# Patient Record
Sex: FEMALE | Race: WHITE | NOT HISPANIC OR LATINO
[De-identification: names, ages, dates, MRNs, and addresses within clinical notes are randomized per-mention and may not be internally consistent; named-entity substitution may affect disease eponyms.]

---

## 2019-03-05 ENCOUNTER — RECORD ABSTRACTING (OUTPATIENT)
Age: 64
End: 2019-03-05

## 2019-03-08 ENCOUNTER — NON-APPOINTMENT (OUTPATIENT)
Age: 64
End: 2019-03-08

## 2019-03-08 ENCOUNTER — APPOINTMENT (OUTPATIENT)
Dept: CARDIOLOGY | Facility: CLINIC | Age: 64
End: 2019-03-08
Payer: MEDICARE

## 2019-03-08 ENCOUNTER — RECORD ABSTRACTING (OUTPATIENT)
Age: 64
End: 2019-03-08

## 2019-03-08 VITALS
SYSTOLIC BLOOD PRESSURE: 120 MMHG | HEART RATE: 72 BPM | HEIGHT: 62 IN | BODY MASS INDEX: 22.45 KG/M2 | RESPIRATION RATE: 16 BRPM | DIASTOLIC BLOOD PRESSURE: 75 MMHG | WEIGHT: 122 LBS

## 2019-03-08 DIAGNOSIS — Z00.00 ENCOUNTER FOR GENERAL ADULT MEDICAL EXAMINATION W/OUT ABNORMAL FINDINGS: ICD-10-CM

## 2019-03-08 DIAGNOSIS — Z87.898 PERSONAL HISTORY OF OTHER SPECIFIED CONDITIONS: ICD-10-CM

## 2019-03-08 DIAGNOSIS — Z82.3 FAMILY HISTORY OF STROKE: ICD-10-CM

## 2019-03-08 PROCEDURE — 99214 OFFICE O/P EST MOD 30 MIN: CPT

## 2019-03-08 PROCEDURE — 93000 ELECTROCARDIOGRAM COMPLETE: CPT

## 2019-03-08 RX ORDER — ATENOLOL 25 MG/1
25 TABLET ORAL DAILY
Refills: 1 | Status: DISCONTINUED | COMMUNITY
End: 2019-03-08

## 2019-03-08 RX ORDER — CHROMIUM 200 MCG
TABLET ORAL DAILY
Refills: 0 | Status: ACTIVE | COMMUNITY

## 2019-03-11 NOTE — REASON FOR VISIT
[FreeTextEntry1] : This is a 64-year-old female who presents here for cardiac reevaluation after nearly a 4-year hiatus.  Her history includes:\par 1.  Mild valvular heart disease. \par 2.  Membranous VST. \par 3.  Periodic palpitations.  \par \par Her primary complaint at this time is an episode of crushing chest pain that occurred about a week ago, awakening her from sleep.  The episode lasted about one minute and it resolved spontaneously.  It was not associated with any radiation pattern, nor any other associated symptoms.  \par \par She has not had this in the past.  She does have gastroesophageal reflux.  There has been increased stress in her life as she will be losing her job shortly. \par \par She does exercise, walking nearly daily, and does not have any exertional symptoms related to this.  She denies shortness of breath, PND, orthopnea, edema, syncope, or near-syncope. \par \par Palpitations have been less frequent. \par \par There are no other significant new medical problems.  \par \par     \par

## 2019-03-11 NOTE — HISTORY OF PRESENT ILLNESS
[FreeTextEntry1] : ECG:  Normal sinus rhythm at 72.  Left atrial enlargement without any significant ST-T wave changes.  Recent laboratory data obtained on 11/3/18 was remarkable for cholesterol 221.  HDL 70. . Electrolytes and LFTs normal.  Hemoglobin 13.3.   Impression: 1.  The patient is a 64-year-old with a history of membranous VSD, palpitations, elevated cholesterol who presents here with an episode of chest tightness.  2.  I think based on the history, it is most likely this was esophageal spasm stemming from her reflux disease.   In view of her age and her active lifestyle, I recommend that she consider regular exercise stress test to screen her.  Additionally, because of her membranous VSD, she should also undergo repeat echocardiogram.    I encouraged her to follow a stricter diet and see if we can get her LDL down without employing any pharmacotherapy.  She has not felt the need to use beta blockers for her palpitations as they have been fairly infrequent.  We will regroup after the testing and asked her to contact with regard to any recurrent chest discomfort.

## 2019-03-11 NOTE — ASSESSMENT
[FreeTextEntry1] : ECG:  Normal sinus rhythm at 72.  Left atrial enlargement without any significant ST-T wave changes.  Recent laboratory data obtained on 11/3/18 was remarkable for cholesterol 221. \par HDL 70.\par .\par Electrolytes and LFTs normal. \par Hemoglobin 13.3. \par \par Impression:\par 1.  The patient is a 64-year-old with a history of membranous VSD, palpitations, elevated cholesterol who presents here with an episode of chest tightness. \par 2.  I think based on the history, it is most likely this was esophageal spasm stemming from her reflux disease. \par \par In view of her age and her active lifestyle, I recommend that she consider regular exercise stress test to screen her.  Additionally, because of her membranous VSD, she should also undergo repeat echocardiogram.  \par \par I encouraged her to follow a stricter diet and see if we can get her LDL down without employing any pharmacotherapy.  She has not felt the need to use beta blockers for her palpitations as they have been fairly infrequent.  We will regroup after the testing and asked her to contact with regard to any recurrent chest discomfort.    \par

## 2019-04-12 ENCOUNTER — APPOINTMENT (OUTPATIENT)
Dept: CARDIOLOGY | Facility: CLINIC | Age: 64
End: 2019-04-12
Payer: MEDICARE

## 2019-04-12 PROCEDURE — 93015 CV STRESS TEST SUPVJ I&R: CPT

## 2019-04-12 PROCEDURE — 93306 TTE W/DOPPLER COMPLETE: CPT

## 2019-06-03 ENCOUNTER — APPOINTMENT (OUTPATIENT)
Dept: CARDIOLOGY | Facility: CLINIC | Age: 64
End: 2019-06-03
Payer: MEDICARE

## 2019-06-03 ENCOUNTER — RECORD ABSTRACTING (OUTPATIENT)
Age: 64
End: 2019-06-03

## 2019-06-03 VITALS
RESPIRATION RATE: 16 BRPM | SYSTOLIC BLOOD PRESSURE: 100 MMHG | BODY MASS INDEX: 23 KG/M2 | WEIGHT: 125 LBS | HEIGHT: 62 IN | OXYGEN SATURATION: 99 % | DIASTOLIC BLOOD PRESSURE: 75 MMHG

## 2019-06-03 DIAGNOSIS — N95.2 POSTMENOPAUSAL ATROPHIC VAGINITIS: ICD-10-CM

## 2019-06-03 PROCEDURE — 93000 ELECTROCARDIOGRAM COMPLETE: CPT

## 2019-06-03 PROCEDURE — 99214 OFFICE O/P EST MOD 30 MIN: CPT

## 2019-06-03 NOTE — REASON FOR VISIT
[FreeTextEntry1] : This is a 64-year-old female who presents here for cardiac reevaluation\par  Her history includes:\par 1.  Mild valvular heart disease. \par 2.  Membranous VST. \par 3.  Periodic palpitations.  \par \par There have been no recurrent episodes of chest pain since her first episode in March of 2019 which was thought to be cardiac vs gastroesophageal reflux which she has a history of.\par  \par \par She does exercise, walking nearly daily, and does not have any exertional symptoms related to this.  She denies shortness of breath, PND, orthopnea, edema, syncope, or near-syncope. \par \par Denies any recent palpations at this time. \par \par There are no other significant new medical problems.  \par \par     \par

## 2019-06-03 NOTE — ASSESSMENT
[FreeTextEntry1] : ECG:  Normal sinus rhythm at 72.  Left atrial enlargement without any significant ST-T wave changes.  \par \par Echocardiogram 4/12/19:\par Overall normal LV size and function. Ejection fraction 60-65%\par VSD is not seen on this study.\par Mild mitral and tricuspid insufficiency.\par No evidence of pulmonary hypertension.\par \par Recent laboratory data obtained on 11/3/18 was remarkable for cholesterol 221. \par HDL 70.\par .\par Electrolytes and LFTs normal. \par Hemoglobin 13.3. \par \par Impression:\par 1.  The patient is a 64-year-old with a history of membranous VSD, palpitations, elevated cholesterol who presents for cardiac re-evaluation and resolution of x 1 episode of chest tightness in March of 2019. It was thought to be related to an esophageal spasm stemming from her reflux disease.\par \par 2. Echo revealed  aortic plaque formation. Therefore  I have prescribed Atorvastatin 10 mg once daily and have repeat blood work collected in 3 months\par \par 3. At this time no cardiac testing needs to be performed and she can follow up in one year.\par \par 4. At this time her palpations have been infrequent and  controlled with out the use of beta blockers.\par She is to contact my office  she is experiencing SOB  chest pain \par \par

## 2019-09-09 ENCOUNTER — RX RENEWAL (OUTPATIENT)
Age: 64
End: 2019-09-09

## 2019-09-09 LAB
ALBUMIN SERPL ELPH-MCNC: 4.5 G/DL
ALP BLD-CCNC: 67 U/L
ALT SERPL-CCNC: 9 U/L
AST SERPL-CCNC: 22 U/L
BILIRUB DIRECT SERPL-MCNC: 0.1 MG/DL
BILIRUB INDIRECT SERPL-MCNC: 0.3 MG/DL
BILIRUB SERPL-MCNC: 0.4 MG/DL
CHOLEST SERPL-MCNC: 174 MG/DL
CHOLEST/HDLC SERPL: 2.3 RATIO
HDLC SERPL-MCNC: 77 MG/DL
LDLC SERPL CALC-MCNC: 82 MG/DL
PROT SERPL-MCNC: 7 G/DL
TRIGL SERPL-MCNC: 74 MG/DL

## 2020-05-31 LAB
ALBUMIN SERPL ELPH-MCNC: 4.6 G/DL
ALP BLD-CCNC: 59 U/L
ALT SERPL-CCNC: 12 U/L
AST SERPL-CCNC: 26 U/L
BILIRUB DIRECT SERPL-MCNC: 0.1 MG/DL
BILIRUB INDIRECT SERPL-MCNC: 0.3 MG/DL
BILIRUB SERPL-MCNC: 0.4 MG/DL
PROT SERPL-MCNC: 7 G/DL

## 2020-06-01 ENCOUNTER — TRANSCRIPTION ENCOUNTER (OUTPATIENT)
Age: 65
End: 2020-06-01

## 2020-07-10 ENCOUNTER — NON-APPOINTMENT (OUTPATIENT)
Age: 65
End: 2020-07-10

## 2020-07-10 ENCOUNTER — APPOINTMENT (OUTPATIENT)
Dept: CARDIOLOGY | Facility: CLINIC | Age: 65
End: 2020-07-10
Payer: MEDICARE

## 2020-07-10 VITALS
BODY MASS INDEX: 22.08 KG/M2 | WEIGHT: 120 LBS | TEMPERATURE: 98 F | HEART RATE: 56 BPM | HEIGHT: 62 IN | DIASTOLIC BLOOD PRESSURE: 70 MMHG | SYSTOLIC BLOOD PRESSURE: 100 MMHG | RESPIRATION RATE: 16 BRPM | OXYGEN SATURATION: 99 %

## 2020-07-10 PROCEDURE — 99214 OFFICE O/P EST MOD 30 MIN: CPT

## 2020-07-10 PROCEDURE — 93000 ELECTROCARDIOGRAM COMPLETE: CPT

## 2020-07-10 NOTE — REASON FOR VISIT
[FreeTextEntry1] : This is a 65 -year-old female who presents here for cardiac reevaluation\par  Her history includes:\par 1.  Mild valvular heart disease. \par 2.  Membranous VSD. \par 3.  Periodic palpitations.  \par \par There have been no recurrent episodes of chest pain since her first episode in March of 2019 which was thought to be cardiac vs gastroesophageal reflux which she has a history of.\par  \par \par She does exercise, walking nearly daily, and does not have any exertional symptoms related to this.  She denies shortness of breath, PND, orthopnea, edema, syncope, or near-syncope. \par \par Denies any recent palpations at this time. \par \par There are no other significant new medical problems.  \par \par     \par

## 2020-07-10 NOTE — HISTORY OF PRESENT ILLNESS
[FreeTextEntry1] : She exercises regularly and without difficulty.\par There've been no new intercurrent or polyps.\par Recent blood work was performed in May.\par

## 2020-11-04 LAB
CHOLEST SERPL-MCNC: 185 MG/DL
CHOLEST/HDLC SERPL: 2.3 RATIO
HDLC SERPL-MCNC: 80 MG/DL
LDLC SERPL CALC-MCNC: 90 MG/DL
TRIGL SERPL-MCNC: 77 MG/DL

## 2020-11-17 ENCOUNTER — TRANSCRIPTION ENCOUNTER (OUTPATIENT)
Age: 65
End: 2020-11-17

## 2020-11-24 ENCOUNTER — APPOINTMENT (OUTPATIENT)
Dept: SURGERY | Facility: CLINIC | Age: 65
End: 2020-11-24
Payer: MEDICARE

## 2020-11-24 VITALS
WEIGHT: 120 LBS | HEIGHT: 62 IN | DIASTOLIC BLOOD PRESSURE: 86 MMHG | SYSTOLIC BLOOD PRESSURE: 134 MMHG | HEART RATE: 66 BPM | BODY MASS INDEX: 22.08 KG/M2 | OXYGEN SATURATION: 99 %

## 2020-11-24 VITALS — TEMPERATURE: 98.1 F

## 2020-11-24 DIAGNOSIS — Z86.39 PERSONAL HISTORY OF OTHER ENDOCRINE, NUTRITIONAL AND METABOLIC DISEASE: ICD-10-CM

## 2020-11-24 PROCEDURE — 99213 OFFICE O/P EST LOW 20 MIN: CPT

## 2020-11-24 NOTE — PHYSICAL EXAM
[Normocephalic] : normocephalic [Atraumatic] : atraumatic [EOMI] : extra ocular movement intact [PERRL] : pupils equal, round and reactive to light [Sclera nonicteric] : sclera nonicteric [Supple] : supple [No Supraclavicular Adenopathy] : no supraclavicular adenopathy [No Cervical Adenopathy] : no cervical adenopathy [Clear to Auscultation Bilat] : clear to auscultation bilaterally [Normal Sinus Rhythm] : normal sinus rhythm [Examined in the supine and seated position] : examined in the supine and seated position [Symmetrical] : symmetrical [No dominant masses] : no dominant masses in right breast  [No dominant masses] : no dominant masses left breast [No Nipple Retraction] : no left nipple retraction [No Nipple Discharge] : no left nipple discharge [Breast Nipple Inversion] : nipples not inverted [Breast Nipple Retraction] : nipples not retracted [Breast Nipple Flattening] : nipples not flattened [Breast Nipple Fissures] : nipples not fissured [Breast Abnormal Lactation (Galactorrhea)] : no galactorrhea [Breast Abnormal Secretion Bloody Fluid] : no bloody discharge [Breast Abnormal Secretion Serous Fluid] : no serous discharge [Breast Abnormal Secretion Opalescent Fluid] : no milky discharge [No Axillary Lymphadenopathy] : no left axillary lymphadenopathy [Soft] : abdomen soft [Not Tender] : non-tender [Normal Bowel Sounds] : normal bowel sounds  [No Edema] : no edema [No Rashes] : no rashes [No Ulceration] : no ulceration

## 2020-11-24 NOTE — HISTORY OF PRESENT ILLNESS
[FreeTextEntry1] : 65 year old white female with fibrocystic breast for folllow up. Denies any palpable masses or pain. Has no nipple discharge\par risk factors\par  FHx negative\par  1st period 13\par   without childbirth\par    menopause at 46 \par    with two breast bx

## 2020-12-14 LAB
ALBUMIN SERPL ELPH-MCNC: 4.5 G/DL
ALP BLD-CCNC: 64 U/L
ALT SERPL-CCNC: 9 U/L
ANION GAP SERPL CALC-SCNC: 10 MMOL/L
AST SERPL-CCNC: 24 U/L
BILIRUB SERPL-MCNC: 0.4 MG/DL
BUN SERPL-MCNC: 19 MG/DL
CALCIUM SERPL-MCNC: 9.5 MG/DL
CHLORIDE SERPL-SCNC: 103 MMOL/L
CHOLEST SERPL-MCNC: 176 MG/DL
CO2 SERPL-SCNC: 25 MMOL/L
CREAT SERPL-MCNC: 1.03 MG/DL
GLUCOSE SERPL-MCNC: 87 MG/DL
HDLC SERPL-MCNC: 75 MG/DL
LDLC SERPL CALC-MCNC: 86 MG/DL
NONHDLC SERPL-MCNC: 101 MG/DL
POTASSIUM SERPL-SCNC: 4.4 MMOL/L
PROT SERPL-MCNC: 6.8 G/DL
SODIUM SERPL-SCNC: 138 MMOL/L
TRIGL SERPL-MCNC: 75 MG/DL

## 2021-02-22 ENCOUNTER — TRANSCRIPTION ENCOUNTER (OUTPATIENT)
Age: 66
End: 2021-02-22

## 2021-05-19 ENCOUNTER — NON-APPOINTMENT (OUTPATIENT)
Age: 66
End: 2021-05-19

## 2021-06-03 LAB
ALBUMIN SERPL ELPH-MCNC: 4.4 G/DL
ALP BLD-CCNC: 69 U/L
ALT SERPL-CCNC: 8 U/L
ANION GAP SERPL CALC-SCNC: 10 MMOL/L
AST SERPL-CCNC: 21 U/L
BILIRUB SERPL-MCNC: 0.4 MG/DL
BUN SERPL-MCNC: 12 MG/DL
CALCIUM SERPL-MCNC: 9.6 MG/DL
CHLORIDE SERPL-SCNC: 105 MMOL/L
CHOLEST SERPL-MCNC: 156 MG/DL
CO2 SERPL-SCNC: 25 MMOL/L
CREAT SERPL-MCNC: 0.83 MG/DL
GLUCOSE SERPL-MCNC: 89 MG/DL
HDLC SERPL-MCNC: 68 MG/DL
LDLC SERPL CALC-MCNC: 68 MG/DL
NONHDLC SERPL-MCNC: 87 MG/DL
POTASSIUM SERPL-SCNC: 4.8 MMOL/L
PROT SERPL-MCNC: 6.5 G/DL
SODIUM SERPL-SCNC: 140 MMOL/L
TRIGL SERPL-MCNC: 94 MG/DL

## 2021-06-14 ENCOUNTER — APPOINTMENT (OUTPATIENT)
Dept: CARDIOLOGY | Facility: CLINIC | Age: 66
End: 2021-06-14
Payer: MEDICARE

## 2021-06-14 VITALS
SYSTOLIC BLOOD PRESSURE: 98 MMHG | BODY MASS INDEX: 22.08 KG/M2 | RESPIRATION RATE: 16 BRPM | DIASTOLIC BLOOD PRESSURE: 70 MMHG | WEIGHT: 120 LBS | TEMPERATURE: 97.2 F | HEART RATE: 65 BPM | HEIGHT: 62 IN | OXYGEN SATURATION: 99 %

## 2021-06-14 PROCEDURE — 99214 OFFICE O/P EST MOD 30 MIN: CPT

## 2021-06-14 PROCEDURE — 93000 ELECTROCARDIOGRAM COMPLETE: CPT

## 2021-06-14 RX ORDER — ASPIRIN 325 MG/1
TABLET, FILM COATED ORAL
Refills: 0 | Status: DISCONTINUED | COMMUNITY
End: 2021-06-14

## 2021-06-14 NOTE — PHYSICAL EXAM
[FreeTextEntry1] :                    Well appearing and nourished with no obvious deformities or distress.\par \par Eyes: \par No conjunctival injection and no xanthelasmas.\par HEENT: \par Normocephalic.Normal oral mucosa. No pallor or cyanosis\par Neck: \par No jugular venous distension. with normal A and V wave forms. No palpable adenopathy.\par Cardiovascular: \par Normal rate and rhythm with normal S1, S2 and a grade 1/6 systolic murmur. Distal arterial pulses are normal. No significant peripheral edema.\par Pulmonary: \par Lungs are clear to auscultation and percussion. Normal respiratory pattern without any accessory muscle use\par Abdomen: \par Soft, non-tender ; no palpable organomegaly or masses.\par Extremities:\par No digital clubbing, cyanosis or ischemic changes.\par Skin: Erythematous rash on trunk and arms consistent with poison ivy\par No skin lesions, rashes, ulcers or xanthomas.\par Psychiatric: \par Alert and oriented to person, place and time. Appropriate mood and affect.

## 2021-06-14 NOTE — REASON FOR VISIT
[FreeTextEntry1] : This is a 66 -year-old female who presents here for cardiac reevaluation\par  Her history includes:\par 1.  Mild valvular heart disease. \par 2.  Membranous VSD. \par 3.  Periodic palpitations.  \par 4.  Atherosclerosis of the aorta\par 5.  Hyperlipidemia\par \par There have been no recurrent episodes of chest pain since her first episode in March of 2019 which was thought to be cardiac vs gastroesophageal reflux \par  \par She does exercise, walking nearly daily, and does not have any exertional symptoms related to this.  She denies shortness of breath, PND, orthopnea, edema, syncope, or near-syncope. \par \par Denies any recent palpations at this time. \par \par Recent contact and outbreak secondary to poison ivy\par There are no other significant new medical problems.  \par \par     \par

## 2021-06-14 NOTE — ASSESSMENT
[FreeTextEntry1] : ECG:  Normal sinus rhythm at 65 .  Left atrial enlargement , PRWP without any significant ST-T wave changes.  \par \par Echocardiogram 19:\par Overall normal LV size and function. Ejection fraction 60-65%\par VSD is not seen on this study.\par Mild mitral and tricuspid insufficiency.\par No evidence of pulmonary hypertension.\par \par Lab Data     \par       5/29/20    6/3/21\par Chol: 185         156\par HDL: 80            68\par LDL: 90            68\par Tr            94\par \par  laboratory data 11/3/18 \par cholesterol 221. \par HDL 70.\par .\par Electrolytes and LFTs normal. \par Hemoglobin 13.3. \par \par Impression:\par 1.  The patient is a 66 -year-old with a history of membranous VSD, palpitations, elevated cholesterol who presents for cardiac re-evaluation \par \par 2. Echo revealed  aortic plaque formation. Therefore  started Atorvastatin 10 mg once daily and  repeat blood work work showed appropriate reduction of cholesterol\par \par 3.  VSD, cardiac chamber sizes and valvular disease has been relatively stable.  Repeat echocardiogram is anticipated here at follow-up\par \par 4. At this time her palpations have been infrequent and  controlled with out the use of beta blockers.\par She is to contact my office  she is experiencing SOB  chest pain \par \par Plan:\par 1.  Repeat laboratory data in 6 months\par \par 2.  Echocardiogram at follow-up here in 6 months\par \par 3.  Contact me with regard to any new symptoms of palpitations shortness of breath or chest pain

## 2021-06-14 NOTE — HISTORY OF PRESENT ILLNESS
[FreeTextEntry1] : She exercises regularly and without difficulty.\par There've been no new intercurrent or polyps.\par Recent blood work was performed in June\par

## 2021-06-14 NOTE — REVIEW OF SYSTEMS
[Weight Gain (___ Lbs)] : no recent weight gain [Weight Loss (___ Lbs)] : no recent weight loss [Rash] : rash [FreeTextEntry2] : Other than as documented here and in the HPI, the thirteen point ROS is negative

## 2021-06-18 ENCOUNTER — TRANSCRIPTION ENCOUNTER (OUTPATIENT)
Age: 66
End: 2021-06-18

## 2021-07-02 ENCOUNTER — TRANSCRIPTION ENCOUNTER (OUTPATIENT)
Age: 66
End: 2021-07-02

## 2021-12-09 ENCOUNTER — APPOINTMENT (OUTPATIENT)
Dept: ORTHOPEDIC SURGERY | Facility: CLINIC | Age: 66
End: 2021-12-09

## 2021-12-17 LAB
ALBUMIN SERPL ELPH-MCNC: 4.3 G/DL
ALP BLD-CCNC: 72 U/L
ALT SERPL-CCNC: 9 U/L
ANION GAP SERPL CALC-SCNC: 12 MMOL/L
AST SERPL-CCNC: 23 U/L
BILIRUB SERPL-MCNC: 0.3 MG/DL
BUN SERPL-MCNC: 17 MG/DL
CALCIUM SERPL-MCNC: 9.3 MG/DL
CHLORIDE SERPL-SCNC: 104 MMOL/L
CHOLEST SERPL-MCNC: 169 MG/DL
CO2 SERPL-SCNC: 24 MMOL/L
CREAT SERPL-MCNC: 0.95 MG/DL
GLUCOSE SERPL-MCNC: 90 MG/DL
HDLC SERPL-MCNC: 76 MG/DL
LDLC SERPL CALC-MCNC: 78 MG/DL
NONHDLC SERPL-MCNC: 93 MG/DL
POTASSIUM SERPL-SCNC: 4.7 MMOL/L
PROT SERPL-MCNC: 6.8 G/DL
SODIUM SERPL-SCNC: 140 MMOL/L
TRIGL SERPL-MCNC: 75 MG/DL

## 2021-12-20 ENCOUNTER — APPOINTMENT (OUTPATIENT)
Dept: SURGERY | Facility: CLINIC | Age: 66
End: 2021-12-20
Payer: MEDICARE

## 2021-12-20 VITALS
WEIGHT: 120 LBS | TEMPERATURE: 96.7 F | OXYGEN SATURATION: 100 % | BODY MASS INDEX: 22.08 KG/M2 | DIASTOLIC BLOOD PRESSURE: 80 MMHG | HEART RATE: 67 BPM | HEIGHT: 62 IN | SYSTOLIC BLOOD PRESSURE: 127 MMHG

## 2021-12-20 PROCEDURE — 99212 OFFICE O/P EST SF 10 MIN: CPT

## 2021-12-20 NOTE — ASSESSMENT
[FreeTextEntry1] : I have reviewed the results of her breast mammo and sonogram of 12/10/21 which were benign

## 2021-12-20 NOTE — HISTORY OF PRESENT ILLNESS
[FreeTextEntry1] : 66 year old white female with fibrocystic breasts for followup. She denies any new masses, pain or swelling. She does not have nipple discharge.\par RISK FACTORS'\par   1st period 11\par    without child birth\par     menopause  at 48\par     FHX grandmother with uterine cancer

## 2021-12-20 NOTE — PAST MEDICAL HISTORY
[Postmenopausal] : The patient is postmenopausal [Menarche Age ____] : age at menarche was [unfilled] [Menopause Age____] : age at menopause was [unfilled] [History of Hormone Replacement Treatment] : has no history of hormone replacement treatment [FreeTextEntry5] : N/A [FreeTextEntry6] : N/A [FreeTextEntry7] : N/A [FreeTextEntry8] : N/A

## 2022-01-24 ENCOUNTER — TRANSCRIPTION ENCOUNTER (OUTPATIENT)
Age: 67
End: 2022-01-24

## 2022-03-15 ENCOUNTER — APPOINTMENT (OUTPATIENT)
Dept: ORTHOPEDIC SURGERY | Facility: CLINIC | Age: 67
End: 2022-03-15

## 2022-04-13 ENCOUNTER — NON-APPOINTMENT (OUTPATIENT)
Age: 67
End: 2022-04-13

## 2022-04-14 ENCOUNTER — APPOINTMENT (OUTPATIENT)
Dept: CARDIOLOGY | Facility: CLINIC | Age: 67
End: 2022-04-14
Payer: MEDICARE

## 2022-04-14 PROCEDURE — 93306 TTE W/DOPPLER COMPLETE: CPT

## 2022-04-28 ENCOUNTER — APPOINTMENT (OUTPATIENT)
Dept: CARDIOLOGY | Facility: CLINIC | Age: 67
End: 2022-04-28
Payer: MEDICARE

## 2022-04-28 VITALS
HEART RATE: 56 BPM | WEIGHT: 125 LBS | SYSTOLIC BLOOD PRESSURE: 130 MMHG | HEIGHT: 62 IN | DIASTOLIC BLOOD PRESSURE: 70 MMHG | BODY MASS INDEX: 23 KG/M2 | OXYGEN SATURATION: 98 % | RESPIRATION RATE: 16 BRPM

## 2022-04-28 PROCEDURE — 93000 ELECTROCARDIOGRAM COMPLETE: CPT

## 2022-04-28 PROCEDURE — 99214 OFFICE O/P EST MOD 30 MIN: CPT

## 2022-04-28 NOTE — REVIEW OF SYSTEMS
[Weight Gain (___ Lbs)] : [unfilled] ~Ulb weight gain [Weight Loss (___ Lbs)] : no recent weight loss [Rash] : rash [FreeTextEntry2] : Other than as documented here and in the HPI, the thirteen point ROS is negative

## 2022-04-28 NOTE — ASSESSMENT
[FreeTextEntry1] : ECG:  Normal sinus rhythm at 56.  Left atrial enlargement , PRWP without any significant ST-T wave changes.  \par \par Echocardiogram 4/14/22\par Normal LV function EF 60%\par Moderate TR\par Possible small membranous VSD\par No evidence of pulmonary hypertension.\par \par Echocardiogram 4/12/19:\par Overall normal LV size and function. Ejection fraction 60-65%\par VSD is not seen on this study.\par Mild mitral and tricuspid insufficiency.\par No evidence of pulmonary hypertension.\par \par Lab Data     \par ----5/29/20----6/3/21\par Chol---185------156\par HDL----80--------68\par LDL----90--------68\par Trg-----77--------94\par \par  laboratory data 11/3/18 \par cholesterol 221. \par HDL 70.\par .\par Electrolytes and LFTs normal. \par Hemoglobin 13.3. \par \par Impression:\par 1.  The patient is a 67 -year-old with a history of membranous VSD, palpitations, elevated cholesterol who presents for cardiac re-evaluation \par \par 2. Echo revealed  aortic plaque formation and was started on atorvastatin 10 mg once daily and  repeat blood work work showed appropriate reduction of cholesterol.  Patient voices concerns about potential for coronary artery plaquing and possible obstructive disease\par \par 3.  VSD, cardiac chamber sizes and valvular disease has been relatively stable.  Repeat echocardiogram revealed no substantial changes.\par \par 4.  Palpitations previously well controlled with beta-blockers, however, little more frequent lately.\par \par 5.  Vaguely described dizziness, which sounds more like disequilibrium.  Relationship between onset and the spring pollen cannot be ignored.  Retrospectively patient had the symptoms last spring as well.\par \par Plan:\par 1.  Coronary CT scoring to be obtained\par \par 2.  Trial antihistamine daily.\par \par 3.  Contact me with regard to any new symptoms of palpitations shortness of breath or chest pain\par \par 4.  Repeat labs at 6-month interval.  Clinical follow-up here in the fall.

## 2022-04-28 NOTE — REASON FOR VISIT
[FreeTextEntry1] : This is a 67  -year-old female who presents here for cardiac reevaluation\par  Her history includes:\par 1.  Mild valvular heart disease. \par 2.  Membranous VSD. \par 3.  Periodic palpitations.  \par 4.  Atherosclerosis of the aorta\par 5.  Hyperlipidemia\par \par Patient presents here with concerns about dizziness of several weeks duration.\par The dizziness is best described as an off-balance sensation.  There is no vertigo.  No change in vision.  No syncope or presyncope.\par \par Approximately 3 months ago she had 1 episode in her kitchen when she suddenly felt presyncopal.  There was a generalized loss of vision but no true loss of consciousness.  This was short-lived and spontaneously resolved.\par \par Patient also continues to have palpitations which are a chronic complaint that have not substantially changed.  \par \par A work-up thus far has been undertaken including imaging of her brain with an MRI and a CT scan.  The results are all fairly benign.  \par \par There have been no recurrent episodes of chest pain since her first episode in March of 2019 which was thought to be cardiac vs gastroesophageal reflux \par  \par She does exercise, walking nearly daily, and does not have any exertional symptoms related to this.  She denies shortness of breath, PND, orthopnea, edema, syncope, or near-syncope. \par \par \par     \par

## 2022-06-08 ENCOUNTER — APPOINTMENT (OUTPATIENT)
Dept: AFTER HOURS CARE | Facility: EMERGENCY ROOM | Age: 67
End: 2022-06-08
Payer: MEDICARE

## 2022-06-08 ENCOUNTER — TRANSCRIPTION ENCOUNTER (OUTPATIENT)
Age: 67
End: 2022-06-08

## 2022-06-08 DIAGNOSIS — U07.1 COVID-19: ICD-10-CM

## 2022-06-08 PROCEDURE — 99203 OFFICE O/P NEW LOW 30 MIN: CPT | Mod: CS,95

## 2022-06-08 NOTE — PHYSICAL EXAM
[No Acute Distress] : no acute distress [Well Nourished] : well nourished [Well Developed] : well developed [Well-Appearing] : well-appearing [Normal Sclera/Conjunctiva] : normal sclera/conjunctiva [No JVD] : no jugular venous distention [No Respiratory Distress] : no respiratory distress  [No Accessory Muscle Use] : no accessory muscle use [No Rash] : no rash [Coordination Grossly Intact] : coordination grossly intact [No Focal Deficits] : no focal deficits [Normal Affect] : the affect was normal [Normal Insight/Judgement] : insight and judgment were intact

## 2022-06-08 NOTE — REVIEW OF SYSTEMS
[Fever] : fever [Sore Throat] : sore throat [Headache] : headache [Chills] : no chills [Fatigue] : no fatigue [Night Sweats] : no night sweats [Vision Problems] : no vision problems [Nasal Discharge] : no nasal discharge [Chest Pain] : no chest pain [Paroxysmal Nocturnal Dyspnea] : no paroxysmal nocturnal dyspnea [Shortness Of Breath] : no shortness of breath [Dyspnea on Exertion] : no dyspnea on exertion [Abdominal Pain] : no abdominal pain [Diarrhea] : diarrhea [Vomiting] : no vomiting [Joint Pain] : no joint pain [Skin Rash] : no skin rash

## 2022-06-08 NOTE — HISTORY OF PRESENT ILLNESS
[Home] : at home, [unfilled] , at the time of the visit. [Other Location: e.g. Home (Enter Location, City,State)___] : at [unfilled] [Verbal consent obtained from patient] : the patient, [unfilled] [FreeTextEntry8] : 66 yo F with hx of valvular heart ds and high cholesterol requesting MAB infusion.  Pt with onset of s/s yesterday with assoc "scratchy" throat.  Pt neg home test yesterday and received her 2nd Moderna booster yesterday.  Today tested + with low grade fever and headache. No assoc CP, SOB, AGUILA or GI s/s

## 2022-06-08 NOTE — PLAN
[FreeTextEntry1] : Referral for MAB infusion Supportive and symptomatic treatment until infusion is scheduled  Instructions and precautions reviewed

## 2022-06-08 NOTE — ASSESSMENT
[FreeTextEntry1] : Case d/w ID and felt pt can safely receive MAB with receiving booster yesterday MAB questionnaire completed

## 2022-06-09 ENCOUNTER — OUTPATIENT (OUTPATIENT)
Dept: OUTPATIENT SERVICES | Facility: HOSPITAL | Age: 67
LOS: 1 days | End: 2022-06-09

## 2022-06-09 ENCOUNTER — APPOINTMENT (OUTPATIENT)
Dept: DISASTER EMERGENCY | Facility: HOSPITAL | Age: 67
End: 2022-06-09

## 2022-06-09 VITALS
WEIGHT: 125.66 LBS | HEIGHT: 62 IN | TEMPERATURE: 99 F | SYSTOLIC BLOOD PRESSURE: 113 MMHG | RESPIRATION RATE: 16 BRPM | OXYGEN SATURATION: 97 % | DIASTOLIC BLOOD PRESSURE: 74 MMHG | HEART RATE: 91 BPM

## 2022-06-09 VITALS
TEMPERATURE: 99 F | SYSTOLIC BLOOD PRESSURE: 108 MMHG | DIASTOLIC BLOOD PRESSURE: 73 MMHG | OXYGEN SATURATION: 97 % | RESPIRATION RATE: 16 BRPM

## 2022-06-09 DIAGNOSIS — U07.1 COVID-19: ICD-10-CM

## 2022-06-09 RX ORDER — BEBTELOVIMAB 87.5 MG/ML
175 INJECTION, SOLUTION INTRAVENOUS ONCE
Refills: 0 | Status: COMPLETED | OUTPATIENT
Start: 2022-06-09 | End: 2022-06-09

## 2022-06-09 RX ADMIN — BEBTELOVIMAB 175 MILLIGRAM(S): 87.5 INJECTION, SOLUTION INTRAVENOUS at 15:00

## 2022-06-09 NOTE — CHART NOTE - NSCHARTNOTEFT_GEN_A_CORE
CC: Monoclonal Antibody Infusion/COVID 19 Positive on 6/08/22  67y Female with recent dx of COVID 19+ who presents today for elective Bebtelovimab. Patient has been screened and was deemed to be a candidate.    Symptoms/ Criteria  Date of Symptom Onset: 6/08/22  Symptoms: fever cough congestion malaise HA  Date of Positive COVID PCR: 6/08/22  Risk Profile includes:   Asthma/ COPD, HLD, MVP    Vaccination Status: Moderna with Booster -last was 2 days ago    PMHx:  Infection due to severe acute respiratory syndrome coronavirus 2 (SARS-CoV-2)        Exam/findings:  T(C): 37.2 (06-09-22 @ 14:55), Max: 37.2 (06-09-22 @ 14:55)  HR: 91 (06-09-22 @ 14:55) (91 - 91)  BP: 113/74 (06-09-22 @ 14:55) (113/74 - 113/74)  RR: 16 (06-09-22 @ 14:55) (16 - 16)  SpO2: 97% (06-09-22 @ 14:55) (97% - 97%)    PE:   Appearance: NAD	  HEENT:  NC/AT  Cardiovascular:  No edema  Respiratory: no use of accessory muscles  Gastrointestinal:  non-distended   Skin: warm and dry  Neurologic: Non-focal  Extremities: Normal range of motion    ASSESSMENT:  Pt is COVID positive with mild to moderate symptoms who was referred for elective MAB (Bebtelovimab).    PLAN:  - MAB treatment explained to patient. I have reviewed the Bebtelovimab Emergency Use Authorization (EUA) and I have provided the patient or patient's caregiver with the following information:   1. FDA has authorized emergency use of Bebtelovimab to be administered for the treatment of mild to moderate COVID-19, which is not an FDA-approved biological product.   2. The patient or patient's caregiver has the option to accept or refuse administration of MAB.   3. The significant known and potential risks and benefits of Bebtelovimab and the extent to which such risks and benefits are unknown.  4. Information on available alternative treatments and risks and benefits of those alternatives.  - Patient verbalized understanding of plan and agrees to treatment. All questions answered.  - Consent for MAB obtained.   - 175mg of Bebtelovimab administered as a single intravenous injection over at least 30 seconds.   - Observe patient for one hour post medication administration and then if stable, discharge home with oupatient follow up as planned by PCP.      POST ASSESSMENT:   Patient completed MAB, and monitored x 1 hour post-infusion with no adverse reactions noted, remained hemodynamically stable.  - Patient tolerated infusion well; denies complaints of chest pain/SOB/dizziness/palpitations.   - VSS for discharge home.  - D/C instructions given/ fact sheet included.  - Patient was instructed to self-isolate and use infection control measures (e.g wear mask, isolate, social distance, avoid sharing personal items, clean and disinfect "high touch" surfaces, and frequent handwashing according to the CDC guidelines.   - The patient was informed on what symptoms to be aware of for the next couple of days, and if there are any issues to call the 24/7 clinical call center. Patient was instructed to follow up with primary care provider as needed.  -Discharge 1 hour post-injection

## 2022-06-10 ENCOUNTER — TRANSCRIPTION ENCOUNTER (OUTPATIENT)
Age: 67
End: 2022-06-10

## 2022-06-17 ENCOUNTER — APPOINTMENT (OUTPATIENT)
Dept: CARDIOLOGY | Facility: CLINIC | Age: 67
End: 2022-06-17

## 2022-06-21 ENCOUNTER — APPOINTMENT (OUTPATIENT)
Dept: ORTHOPEDIC SURGERY | Facility: CLINIC | Age: 67
End: 2022-06-21

## 2022-06-22 ENCOUNTER — RX RENEWAL (OUTPATIENT)
Age: 67
End: 2022-06-22

## 2022-06-23 LAB
CHOLEST SERPL-MCNC: 176 MG/DL
HDLC SERPL-MCNC: 66 MG/DL
LDLC SERPL CALC-MCNC: 91 MG/DL
NONHDLC SERPL-MCNC: 110 MG/DL
TRIGL SERPL-MCNC: 98 MG/DL

## 2022-06-28 ENCOUNTER — NON-APPOINTMENT (OUTPATIENT)
Age: 67
End: 2022-06-28

## 2022-10-24 ENCOUNTER — APPOINTMENT (OUTPATIENT)
Dept: CARDIOLOGY | Facility: CLINIC | Age: 67
End: 2022-10-24

## 2022-10-24 VITALS
RESPIRATION RATE: 16 BRPM | HEIGHT: 62 IN | WEIGHT: 126 LBS | BODY MASS INDEX: 23.19 KG/M2 | DIASTOLIC BLOOD PRESSURE: 70 MMHG | OXYGEN SATURATION: 96 % | SYSTOLIC BLOOD PRESSURE: 120 MMHG | HEART RATE: 66 BPM

## 2022-10-24 DIAGNOSIS — Q21.0 VENTRICULAR SEPTAL DEFECT: ICD-10-CM

## 2022-10-24 DIAGNOSIS — R07.9 CHEST PAIN, UNSPECIFIED: ICD-10-CM

## 2022-10-24 PROCEDURE — 93000 ELECTROCARDIOGRAM COMPLETE: CPT

## 2022-10-24 PROCEDURE — 99214 OFFICE O/P EST MOD 30 MIN: CPT

## 2022-10-24 RX ORDER — SENNOSIDES 8.6 MG
TABLET ORAL
Refills: 0 | Status: ACTIVE | COMMUNITY

## 2022-10-24 NOTE — REVIEW OF SYSTEMS
[Rash] : rash [Weight Gain (___ Lbs)] : no recent weight gain [Weight Loss (___ Lbs)] : no recent weight loss [FreeTextEntry2] : Other than as documented here and in the HPI, the thirteen point ROS is negative

## 2022-10-24 NOTE — REASON FOR VISIT
[FreeTextEntry1] : This is a 67  -year-old female who presents here for cardiac reevaluation\par  Her history includes:\par 1.  Mild valvular heart disease. \par 2.  Membranous VSD. \par 3.  Periodic palpitations.  \par 4.  Atherosclerosis of the aorta\par 5.  Hyperlipidemia\par \par Previously described dizziness has resolved.\par Had imaging of her brain which was apparently unremarkable\par A work-up thus far has been undertaken including imaging of her brain with an MRI and a CT scan.  The results are all fairly benign.  \par \par No recurrent episodes of syncope or near syncope.\par \par Patient also continues to have palpitations which are a chronic complaint that have not substantially changed.  \par \par There have been no recurrent episodes of chest pain since her first episode in March of 2019 which was thought to be cardiac vs gastroesophageal reflux \par  \par She does exercise, walking nearly daily, and does not have any exertional symptoms related to this.  She denies shortness of breath, PND, orthopnea, edema, syncope, or near-syncope. \par \par \par     \par

## 2022-10-24 NOTE — ASSESSMENT
[FreeTextEntry1] : ECG:  Normal sinus rhythm at 66.  Left atrial enlargement , PRWP without any significant ST-T wave changes.  \par \par Echocardiogram 4/14/22\par Normal LV function EF 60%\par Moderate TR\par Possible small membranous VSD\par No evidence of pulmonary hypertension.\par \par Echocardiogram 4/12/19:\par Overall normal LV size and function. Ejection fraction 60-65%\par VSD is not seen on this study.\par Mild mitral and tricuspid insufficiency.\par No evidence of pulmonary hypertension.\par \par Lab Data     \par ----5/29/20----6/3/21--6/23/21\par Chol---185------156-----176\par HDL----80--------68------66\par LDL----90--------68------91\par Trg-----77--------94------98\par \par  laboratory data 11/3/18 \par cholesterol 221. \par HDL 70.\par .\par Electrolytes and LFTs normal. \par Hemoglobin 13.3. \par \par Impression:\par 1.  The patient is a 67 -year-old with a history of membranous VSD, palpitations, elevated cholesterol who presents for cardiac re-evaluation \par \par 2. Echo revealed  aortic plaque formation and was started on atorvastatin 10 mg once daily and  repeat blood work work showed appropriate reduction of cholesterol.  Patient voices concerns about potential for coronary artery plaquing and possible obstructive disease\par \par 3.  VSD, cardiac chamber sizes and valvular disease has been relatively stable.  Repeat echocardiogram revealed no substantial changes.\par \par 4.  Palpitations previously well controlled with beta-blockers, however, little more frequent lately.\par \par 5.  Vaguely described dizziness, which sounds more like disequilibrium.  Relationship between onset and the spring pollen cannot be ignored.  Retrospectively patient had the symptoms last spring as well.\par \par Plan:\par 1.  Coronary CT scoring previously discussed will be considered again next year\par \par 2.  Add resistance exercise to her regimen.\par \par 3.  Contact me with regard to any new symptoms of palpitations shortness of breath or chest pain\par \par 4.  Repeat labs at 6-month interval.  Clinical follow-up here in the spring

## 2022-12-19 ENCOUNTER — APPOINTMENT (OUTPATIENT)
Dept: SURGERY | Facility: CLINIC | Age: 67
End: 2022-12-19

## 2022-12-19 VITALS
HEIGHT: 62 IN | HEART RATE: 72 BPM | DIASTOLIC BLOOD PRESSURE: 73 MMHG | WEIGHT: 126 LBS | SYSTOLIC BLOOD PRESSURE: 120 MMHG | OXYGEN SATURATION: 100 % | BODY MASS INDEX: 23.19 KG/M2

## 2022-12-19 PROCEDURE — 99212 OFFICE O/P EST SF 10 MIN: CPT

## 2022-12-19 NOTE — HISTORY OF PRESENT ILLNESS
[FreeTextEntry1] : 67 year old white female presents for yearly evaluation.  Patient denies new lumps, nipple retraction or discharge.\par Patient s/p Mammo/Bilat Breast US 12/12/22: BI-RADS 2\par \par \par risk factors\par  FHx negative\par  1st period 11\par  without childbirth\par  menopause at 46 \par  with two breast bx \par  \par

## 2022-12-19 NOTE — PAST MEDICAL HISTORY
[Menarche Age ____] : age at menarche was [unfilled] [Menopause Age____] : age at menopause was [unfilled] [unknown] : the patient is unsure of the date of her LMP [FreeTextEntry5] : N/A

## 2022-12-19 NOTE — PHYSICAL EXAM
[Normocephalic] : normocephalic [Supple] : supple [No Supraclavicular Adenopathy] : no supraclavicular adenopathy [Clear to Auscultation Bilat] : clear to auscultation bilaterally [Normal Sinus Rhythm] : normal sinus rhythm [Normal S1, S2] : normal S1 and S2 [Examined in the supine and seated position] : examined in the supine and seated position [Symmetrical] : symmetrical [No dominant masses] : no dominant masses in right breast  [No dominant masses] : no dominant masses left breast [No Nipple Retraction] : no left nipple retraction [No Nipple Discharge] : no left nipple discharge [No Axillary Lymphadenopathy] : no left axillary lymphadenopathy [Soft] : abdomen soft [Not Tender] : non-tender [Normal Bowel Sounds] : normal bowel sounds  [No Edema] : no edema [No Rashes] : no rashes [No Ulceration] : no ulceration

## 2022-12-29 LAB
ALBUMIN SERPL ELPH-MCNC: 4.1 G/DL
ALP BLD-CCNC: 71 U/L
ALT SERPL-CCNC: 9 U/L
ANION GAP SERPL CALC-SCNC: 9 MMOL/L
AST SERPL-CCNC: 19 U/L
BILIRUB SERPL-MCNC: 0.4 MG/DL
BUN SERPL-MCNC: 14 MG/DL
CALCIUM SERPL-MCNC: 9.5 MG/DL
CHLORIDE SERPL-SCNC: 105 MMOL/L
CHOLEST SERPL-MCNC: 172 MG/DL
CO2 SERPL-SCNC: 26 MMOL/L
CREAT SERPL-MCNC: 0.81 MG/DL
EGFR: 79 ML/MIN/1.73M2
GLUCOSE SERPL-MCNC: 87 MG/DL
HDLC SERPL-MCNC: 72 MG/DL
LDLC SERPL CALC-MCNC: 76 MG/DL
NONHDLC SERPL-MCNC: 100 MG/DL
POTASSIUM SERPL-SCNC: 5 MMOL/L
PROT SERPL-MCNC: 6.4 G/DL
SODIUM SERPL-SCNC: 139 MMOL/L
TRIGL SERPL-MCNC: 118 MG/DL

## 2022-12-30 ENCOUNTER — NON-APPOINTMENT (OUTPATIENT)
Age: 67
End: 2022-12-30

## 2023-01-12 ENCOUNTER — APPOINTMENT (OUTPATIENT)
Dept: ORTHOPEDIC SURGERY | Facility: CLINIC | Age: 68
End: 2023-01-12
Payer: MEDICARE

## 2023-01-12 VITALS
WEIGHT: 126 LBS | SYSTOLIC BLOOD PRESSURE: 127 MMHG | BODY MASS INDEX: 23.19 KG/M2 | HEIGHT: 62 IN | HEART RATE: 86 BPM | DIASTOLIC BLOOD PRESSURE: 82 MMHG

## 2023-01-12 DIAGNOSIS — M25.562 PAIN IN LEFT KNEE: ICD-10-CM

## 2023-01-12 PROCEDURE — 99204 OFFICE O/P NEW MOD 45 MIN: CPT

## 2023-01-12 PROCEDURE — 73564 X-RAY EXAM KNEE 4 OR MORE: CPT | Mod: LT

## 2023-01-12 NOTE — ADDENDUM
[FreeTextEntry1] : This note was authored by Ifeanyi Christy working as a medical scribe for Dr. Ladarius Mosqueda. The note was reviewed, edited, and revised by Dr. Ladarius Mosqueda whom is in agreement with the exam findings, imaging findings, and treatment plan. 01/12/2023

## 2023-01-12 NOTE — PHYSICAL EXAM
[de-identified] : The patient appears well nourished  and in no apparent distress.  The patient is alert and oriented to person, place, and time.   Affect and mood appear normal. The head is normocephalic and atraumatic.  The eyes reveal normal sclera and extra ocular muscles are intact. The tongue is midline with no apparent lesions.  Skin shows normal turgor with no evidence of eczema or psoriasis.  No respiratory distress noted.  Sensation grossly intact.		  [de-identified] : Exam of the left knee shows full extension. There is a small effusion. Positive anterior drawer.\par 5/5 motor strength bilaterally distally. Sensation intact distally.		  [de-identified] : X-ray: 4 views of the left knee demonstrate bone on bone varus arthritis.

## 2023-01-12 NOTE — CONSULT LETTER
[Dear  ___] : Dear  [unfilled], [Consult Letter:] : I had the pleasure of evaluating your patient, [unfilled]. [Please see my note below.] : Please see my note below. [Consult Closing:] : Thank you very much for allowing me to participate in the care of this patient.  If you have any questions, please do not hesitate to contact me. [Sincerely,] : Sincerely, [FreeTextEntry2] : \par  [FreeTextEntry3] : Ladarius Mosqueda MD\par Chief of Joint Replacement\par [facility] \par [facility]\par \par

## 2023-01-12 NOTE — DISCUSSION/SUMMARY
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  She does not have significant chronic longstanding pain and her major concern is episodes of instability of the knee.  Nonoperative treatment options were discussed. A course of Mobic was recommended. The patient was given a prescription for the Mobic with directions. She was instructed to stop the medicine and call the office if there are any adverse reaction to the medicine. She was also instructed to consult with their primary care doctor prior to starting the medication. She declined physical therapy at this time and will continue to exercise on her own as tolerated. The patient will follow up if her pain worsens at which point we will consider injections or TKR surgery.

## 2023-01-12 NOTE — HISTORY OF PRESENT ILLNESS
[de-identified] : Ms. JACK CASEY is a 67 year old female presenting for evaluation of left knee pain. She has a history of intermittent left knee pain for the past few years. She states that 4 years ago she tore the left knee ACL, which was treated conservatively. At that time the knee was aspirated and injected. Patient has not had more recent injections. She went for an MRI recently revealing osteoarthritis, meniscus tearing, and chronic ACL tear. She has tried therapy without improvement. She takes OTC NSAIDs and Tylenol without relief.

## 2023-06-28 ENCOUNTER — LABORATORY RESULT (OUTPATIENT)
Age: 68
End: 2023-06-28

## 2023-06-29 ENCOUNTER — NON-APPOINTMENT (OUTPATIENT)
Age: 68
End: 2023-06-29

## 2023-10-23 ENCOUNTER — APPOINTMENT (OUTPATIENT)
Dept: CARDIOLOGY | Facility: CLINIC | Age: 68
End: 2023-10-23
Payer: MEDICARE

## 2023-10-23 VITALS
DIASTOLIC BLOOD PRESSURE: 80 MMHG | WEIGHT: 125 LBS | OXYGEN SATURATION: 97 % | HEIGHT: 62 IN | RESPIRATION RATE: 16 BRPM | SYSTOLIC BLOOD PRESSURE: 118 MMHG | BODY MASS INDEX: 23 KG/M2 | HEART RATE: 67 BPM

## 2023-10-23 DIAGNOSIS — R00.2 PALPITATIONS: ICD-10-CM

## 2023-10-23 PROCEDURE — 99214 OFFICE O/P EST MOD 30 MIN: CPT

## 2023-10-23 PROCEDURE — 93000 ELECTROCARDIOGRAM COMPLETE: CPT

## 2023-11-17 ENCOUNTER — NON-APPOINTMENT (OUTPATIENT)
Age: 68
End: 2023-11-17

## 2023-12-04 ENCOUNTER — TRANSCRIPTION ENCOUNTER (OUTPATIENT)
Age: 68
End: 2023-12-04

## 2024-01-03 LAB
ALBUMIN SERPL ELPH-MCNC: 4.2 G/DL
ALP BLD-CCNC: 57 U/L
ALT SERPL-CCNC: 11 U/L
ANION GAP SERPL CALC-SCNC: 10 MMOL/L
AST SERPL-CCNC: 19 U/L
BILIRUB SERPL-MCNC: 0.4 MG/DL
BUN SERPL-MCNC: 13 MG/DL
CALCIUM SERPL-MCNC: 9.3 MG/DL
CHLORIDE SERPL-SCNC: 102 MMOL/L
CHOLEST SERPL-MCNC: 165 MG/DL
CO2 SERPL-SCNC: 26 MMOL/L
CREAT SERPL-MCNC: 0.75 MG/DL
EGFR: 86 ML/MIN/1.73M2
GLUCOSE SERPL-MCNC: 79 MG/DL
HDLC SERPL-MCNC: 78 MG/DL
LDLC SERPL CALC-MCNC: 75 MG/DL
NONHDLC SERPL-MCNC: 87 MG/DL
POTASSIUM SERPL-SCNC: 4.6 MMOL/L
PROT SERPL-MCNC: 6.3 G/DL
SODIUM SERPL-SCNC: 138 MMOL/L
TRIGL SERPL-MCNC: 60 MG/DL

## 2024-01-04 ENCOUNTER — APPOINTMENT (OUTPATIENT)
Dept: SURGERY | Facility: CLINIC | Age: 69
End: 2024-01-04
Payer: MEDICARE

## 2024-01-04 ENCOUNTER — NON-APPOINTMENT (OUTPATIENT)
Age: 69
End: 2024-01-04

## 2024-01-04 VITALS — TEMPERATURE: 97.2 F

## 2024-01-04 DIAGNOSIS — R92.30 DENSE BREASTS, UNSPECIFIED: ICD-10-CM

## 2024-01-04 PROCEDURE — 99212 OFFICE O/P EST SF 10 MIN: CPT

## 2024-01-04 NOTE — HISTORY OF PRESENT ILLNESS
[FreeTextEntry1] : Patient denies breast masses or nipple discharge.  Mammo 12/15/2023: Dense Breasts, BI-RADS 1 Breast US 12/12/2023: Dense Breasts, simple cyst Left Breast 8:00, 5 cm FN 0.2 x 0.2 x 0.2 cm, BI-RADS 2   Mammo/Bilat Breast US 12/12/22: BI-RADS 2   risk factors  FHx negative  1st period 11  without childbirth  menopause at 46   with two breast bx

## 2024-01-04 NOTE — PHYSICAL EXAM
[Normocephalic] : normocephalic [Supple] : supple [No Supraclavicular Adenopathy] : no supraclavicular adenopathy [Examined in the supine and seated position] : examined in the supine and seated position [Symmetrical] : symmetrical [No dominant masses] : no dominant masses in right breast  [No dominant masses] : no dominant masses left breast [No Nipple Retraction] : no left nipple retraction [No Nipple Discharge] : no left nipple discharge [Breast Mass Right Breast ___cm] : no masses [Breast Mass Left Breast ___cm] : no masses [Breast Nipple Inversion] : nipples not inverted [Breast Nipple Retraction] : nipples not retracted [Breast Nipple Flattening] : nipples not flattened [Breast Nipple Fissures] : nipples not fissured [Breast Abnormal Lactation (Galactorrhea)] : no galactorrhea [Breast Abnormal Secretion Bloody Fluid] : no bloody discharge [Breast Abnormal Secretion Serous Fluid] : no serous discharge [Breast Abnormal Secretion Opalescent Fluid] : no milky discharge [No Axillary Lymphadenopathy] : no left axillary lymphadenopathy [No Edema] : no edema [No Rashes] : no rashes [No Ulceration] : no ulceration

## 2024-02-04 ENCOUNTER — RX RENEWAL (OUTPATIENT)
Age: 69
End: 2024-02-04

## 2024-04-10 ENCOUNTER — NON-APPOINTMENT (OUTPATIENT)
Age: 69
End: 2024-04-10

## 2024-04-10 ENCOUNTER — APPOINTMENT (OUTPATIENT)
Dept: CARDIOLOGY | Facility: CLINIC | Age: 69
End: 2024-04-10
Payer: MEDICARE

## 2024-04-10 VITALS
DIASTOLIC BLOOD PRESSURE: 74 MMHG | BODY MASS INDEX: 21.16 KG/M2 | SYSTOLIC BLOOD PRESSURE: 122 MMHG | HEIGHT: 62 IN | HEART RATE: 71 BPM | WEIGHT: 115 LBS | RESPIRATION RATE: 12 BRPM

## 2024-04-10 DIAGNOSIS — E78.00 PURE HYPERCHOLESTEROLEMIA, UNSPECIFIED: ICD-10-CM

## 2024-04-10 DIAGNOSIS — I70.0 ATHEROSCLEROSIS OF AORTA: ICD-10-CM

## 2024-04-10 DIAGNOSIS — I38 ENDOCARDITIS, VALVE UNSPECIFIED: ICD-10-CM

## 2024-04-10 DIAGNOSIS — Z01.810 ENCOUNTER FOR PREPROCEDURAL CARDIOVASCULAR EXAMINATION: ICD-10-CM

## 2024-04-10 PROCEDURE — G2211 COMPLEX E/M VISIT ADD ON: CPT

## 2024-04-10 PROCEDURE — 99214 OFFICE O/P EST MOD 30 MIN: CPT

## 2024-04-10 PROCEDURE — 93000 ELECTROCARDIOGRAM COMPLETE: CPT

## 2024-04-10 RX ORDER — OMEPRAZOLE 40 MG/1
40 CAPSULE, DELAYED RELEASE ORAL
Refills: 0 | Status: ACTIVE | COMMUNITY

## 2024-04-10 RX ORDER — PNV NO.95/FERROUS FUM/FOLIC AC 28MG-0.8MG
TABLET ORAL
Refills: 0 | Status: DISCONTINUED | COMMUNITY
End: 2024-04-10

## 2024-04-12 PROBLEM — Z01.810 PRE-PROCEDURAL CARDIOVASCULAR EXAMINATION: Status: ACTIVE | Noted: 2024-04-12

## 2024-04-12 PROBLEM — E78.00 HIGH CHOLESTEROL: Status: ACTIVE | Noted: 2019-06-03

## 2024-04-12 PROBLEM — I38 CARDIAC VALVULOPATHY: Status: ACTIVE | Noted: 2019-03-08

## 2024-04-12 PROBLEM — I70.0 ATHEROSCLEROSIS OF AORTA: Status: ACTIVE | Noted: 2019-06-03

## 2024-04-12 NOTE — REVIEW OF SYSTEMS
[Rash] : rash [Weight Gain (___ Lbs)] : no recent weight gain [Weight Loss (___ Lbs)] : no recent weight loss [FreeTextEntry2] : Other than as documented here and in the HPI, the thirteen point ROS is negative [Negative] : Musculoskeletal

## 2024-04-17 NOTE — PHYSICAL EXAM
[FreeTextEntry1] :                    Well appearing and nourished with no obvious deformities or distress.  Eyes:  No conjunctival injection and no xanthelasmas. HEENT:  Normocephalic.Normal oral mucosa. No pallor or cyanosis Neck:  No jugular venous distension. with normal A and V wave forms. No palpable adenopathy. Cardiovascular:  Normal rate and rhythm with normal S1, S2 and a grade 1/6 systolic murmur. + click, Distal arterial pulses are normal. No significant peripheral edema. Pulmonary:  Lungs are clear to auscultation and percussion. Normal respiratory pattern without any accessory muscle use Abdomen:  Soft, non-tender ; no palpable organomegaly or masses. Extremities: No digital clubbing, cyanosis or ischemic changes. Skin: Erythematous rash on trunk and arms consistent with poison ivy No skin lesions, rashes, ulcers or xanthomas. Psychiatric:  Alert and oriented to person, place and time. Appropriate mood and affect. [Well Developed] : well developed [Well Nourished] : well nourished [No Acute Distress] : no acute distress [Normal Conjunctiva] : normal conjunctiva [Normal Venous Pressure] : normal venous pressure [No Carotid Bruit] : no carotid bruit [Normal S1, S2] : normal S1, S2 [No Rub] : no rub [No Gallop] : no gallop [Murmur] : murmur [Clear Lung Fields] : clear lung fields [Good Air Entry] : good air entry [No Respiratory Distress] : no respiratory distress  [Soft] : abdomen soft [Non Tender] : non-tender [No Masses/organomegaly] : no masses/organomegaly [Normal Bowel Sounds] : normal bowel sounds [Normal Gait] : normal gait [No Edema] : no edema [No Cyanosis] : no cyanosis [No Clubbing] : no clubbing [No Varicosities] : no varicosities [No Rash] : no rash [No Skin Lesions] : no skin lesions [Moves all extremities] : moves all extremities [No Focal Deficits] : no focal deficits [Normal Speech] : normal speech [Alert and Oriented] : alert and oriented [Normal memory] : normal memory [de-identified] : I/VI systolic murmur

## 2024-04-17 NOTE — REASON FOR VISIT
[FreeTextEntry1] : JACK CASEY is a 69 year-old F presents here for cardiac risk stratification for planned endoscopy.  Her medical history includes: 1. Mild valvular heart disease. 2. Membranous VSD. 3. Periodic palpitations. 4. Atherosclerosis of the aorta 5. Hyperlipidemia

## 2024-04-17 NOTE — ASSESSMENT
[FreeTextEntry1] : EKG: SR, normal axis and intervals, no significant ST or T wave abnormalities.   Echocardiogram 4/14/22 Normal LV function EF 60% Moderate TR Possible small membranous VSD No evidence of pulmonary hypertension.  Echocardiogram 4/12/19: Overall normal LV size and function. Ejection fraction 60-65% VSD is not seen on this study. Mild mitral and tricuspid insufficiency. No evidence of pulmonary hypertension.  Lab Data ----5/29/20----6/3/21--6/23/21---6/28/23--12/2023 Chol---185------156-----176-------159--------165 HDL----80--------68------66---------72---------78 LDL----90--------68------91---------71---------75 Trg-----77--------94------98---------78----------60  Impression: 1. The patient is a 69-year-old with a history of membranous VSD, palpitations, elevated cholesterol who presents for cardiac clearance for planned EGD.   2. Echo revealed aortic plaque formation and was started on atorvastatin 10 mg. Lipids improved but not at goal.    3. VSD, cardiac chamber sizes and valvular disease has been relatively stable. Repeat echocardiogram revealed no substantial changes.  4. No recent cardiac symptoms.   5. CT calcium score of 0 suggesting low 10- year risk for CV event.    Plan:  1.Given the results of her testing and absence of cardiac symptoms. There is no absolute cardiac contraindication to the proposed EGD.   2. Pt advised to exercise for at least 30 minutes most days of the week. Any cardiac symptoms such as chest pain, palpitations or new shortness of breath should be reported.  3. Continue statin therapy. Follow-up lipid panel through PMD. Goal LDL less than  70.   4.  Recommend dietary and lifestyle modifications  Clinical follow-up in 6 months or sooner if needed.

## 2024-04-17 NOTE — HISTORY OF PRESENT ILLNESS
[FreeTextEntry1] : Has changed her diet and has lost 10 lbs by intention.  Reports chest discomfort improved on Prilosec. Now feeling well. GI recommending EGD.  Walks 3 miles a day and goes to the gym 3 times a week. Denies exertional symptoms.  She denies any chest pain, dizziness, syncope, near-syncope, SOB, edema, orthopnea or PND.

## 2024-04-17 NOTE — PHYSICAL EXAM
[FreeTextEntry1] :                    Well appearing and nourished with no obvious deformities or distress.  Eyes:  No conjunctival injection and no xanthelasmas. HEENT:  Normocephalic.Normal oral mucosa. No pallor or cyanosis Neck:  No jugular venous distension. with normal A and V wave forms. No palpable adenopathy. Cardiovascular:  Normal rate and rhythm with normal S1, S2 and a grade 1/6 systolic murmur. + click, Distal arterial pulses are normal. No significant peripheral edema. Pulmonary:  Lungs are clear to auscultation and percussion. Normal respiratory pattern without any accessory muscle use Abdomen:  Soft, non-tender ; no palpable organomegaly or masses. Extremities: No digital clubbing, cyanosis or ischemic changes. Skin: Erythematous rash on trunk and arms consistent with poison ivy No skin lesions, rashes, ulcers or xanthomas. Psychiatric:  Alert and oriented to person, place and time. Appropriate mood and affect. [Well Developed] : well developed [Well Nourished] : well nourished [No Acute Distress] : no acute distress [Normal Conjunctiva] : normal conjunctiva [Normal Venous Pressure] : normal venous pressure [No Carotid Bruit] : no carotid bruit [Normal S1, S2] : normal S1, S2 [No Rub] : no rub [No Gallop] : no gallop [Murmur] : murmur [Clear Lung Fields] : clear lung fields [Good Air Entry] : good air entry [No Respiratory Distress] : no respiratory distress  [Soft] : abdomen soft [Non Tender] : non-tender [No Masses/organomegaly] : no masses/organomegaly [Normal Bowel Sounds] : normal bowel sounds [Normal Gait] : normal gait [No Edema] : no edema [No Cyanosis] : no cyanosis [No Clubbing] : no clubbing [No Varicosities] : no varicosities [No Rash] : no rash [No Skin Lesions] : no skin lesions [Moves all extremities] : moves all extremities [No Focal Deficits] : no focal deficits [Normal Speech] : normal speech [Alert and Oriented] : alert and oriented [Normal memory] : normal memory [de-identified] : I/VI systolic murmur

## 2024-05-23 ENCOUNTER — APPOINTMENT (OUTPATIENT)
Dept: GASTROENTEROLOGY | Facility: CLINIC | Age: 69
End: 2024-05-23
Payer: MEDICARE

## 2024-05-23 VITALS
SYSTOLIC BLOOD PRESSURE: 120 MMHG | WEIGHT: 109 LBS | OXYGEN SATURATION: 97 % | DIASTOLIC BLOOD PRESSURE: 70 MMHG | BODY MASS INDEX: 20.06 KG/M2 | RESPIRATION RATE: 16 BRPM | HEART RATE: 70 BPM | HEIGHT: 62 IN

## 2024-05-23 DIAGNOSIS — K76.9 LIVER DISEASE, UNSPECIFIED: ICD-10-CM

## 2024-05-23 PROCEDURE — 99205 OFFICE O/P NEW HI 60 MIN: CPT

## 2024-05-23 NOTE — PHYSICAL EXAM
[Non-Tender] : non-tender [General Appearance - Alert] : alert [General Appearance - In No Acute Distress] : in no acute distress [Sclera] : the sclera and conjunctiva were normal [Outer Ear] : the ears and nose were normal in appearance [Neck Appearance] : the appearance of the neck was normal [Edema] : there was no peripheral edema [Bowel Sounds] : normal bowel sounds [Abdomen Soft] : soft [Abdomen Tenderness] : non-tender [] : no hepato-splenomegaly [Abdomen Mass (___ Cm)] : no abdominal mass palpated [Oriented To Time, Place, And Person] : oriented to person, place, and time [Scleral Icterus] : No Scleral Icterus [Spider Angioma] : No spider angioma(s) were observed [Abdominal  Ascites] : no ascites [Asterixis] : no asterixis observed [Jaundice] : No jaundice [Palmar Erythema] : no Palmar Erythema [Depression] : no depression [Hallucinations] : ~T no ~M hallucinations

## 2024-05-23 NOTE — ADDENDUM
[FreeTextEntry1] : I, Maria G Montoya NP, acted as scribe for LEVI Mchugh for this patient encounter.

## 2024-05-23 NOTE — ASSESSMENT
[FreeTextEntry1] : JACK CASEY is a 69 year old female with a PMH of high cholesterol, osteoporosis and GERD.  Liver lesion -MRI Abdomen w/wo IVC 4/2024 - 8 mm right hepatic lobe hemangioma, and 2 adjacent 10 mm and 6 mm FNH vs inflammatory adenoma. LFTs wnl.  -Will discuss at complex GI rounds -AFP and labs to check hepatic synthetic function ordered. Repeat MRI in 6 months -follow up in 6 months w/imaging

## 2024-05-23 NOTE — HISTORY OF PRESENT ILLNESS
[de-identified] : JACK CASEY is a 69 year old female with a PMH of high cholesterol, osteoporosis and GERD.   She presents today for evaluation of liver lesion on imaging, referred by Dr. Edmondson. Records reviewed, including notes, labs, imaging, etc. MRI Abdomen w/wo IVC 4/2024 - 8 mm right hepatic lobe hemangioma, and 2 adjacent 10 mm and 6 mm FNH vs inflammatory adenoma. LFTs wnl. Denies abdominal pain or distension, jaundice, hematemesis, hematochezia, dark urine, confusion or unintentional weight loss. Denies significant alcohol consumption or otc/herbal supplements. Denies family history of liver disease.

## 2024-05-30 ENCOUNTER — LABORATORY RESULT (OUTPATIENT)
Age: 69
End: 2024-05-30

## 2024-05-30 ENCOUNTER — NON-APPOINTMENT (OUTPATIENT)
Age: 69
End: 2024-05-30

## 2024-05-31 LAB
AFP-TM SERPL-MCNC: 6.6 NG/ML
ALBUMIN SERPL ELPH-MCNC: 4.5 G/DL
ALP BLD-CCNC: 64 U/L
ALT SERPL-CCNC: 10 U/L
ANION GAP SERPL CALC-SCNC: 10 MMOL/L
AST SERPL-CCNC: 24 U/L
BASOPHILS # BLD AUTO: 0.06 K/UL
BASOPHILS NFR BLD AUTO: 1.1 %
BILIRUB INDIRECT SERPL-MCNC: 0.3 MG/DL
BILIRUB SERPL-MCNC: 0.4 MG/DL
BUN SERPL-MCNC: 11 MG/DL
CALCIUM SERPL-MCNC: 9.2 MG/DL
CHLORIDE SERPL-SCNC: 102 MMOL/L
CO2 SERPL-SCNC: 26 MMOL/L
CREAT SERPL-MCNC: 0.83 MG/DL
EGFR: 75 ML/MIN/1.73M2
EOSINOPHIL # BLD AUTO: 0.25 K/UL
EOSINOPHIL NFR BLD AUTO: 4.7 %
GLUCOSE SERPL-MCNC: 90 MG/DL
HCT VFR BLD CALC: 43.9 %
HGB BLD-MCNC: 13.7 G/DL
IMM GRANULOCYTES NFR BLD AUTO: 0.2 %
INR PPP: 0.96 RATIO
LYMPHOCYTES # BLD AUTO: 1.96 K/UL
LYMPHOCYTES NFR BLD AUTO: 36.6 %
MAN DIFF?: NORMAL
MCHC RBC-ENTMCNC: 29.6 PG
MCHC RBC-ENTMCNC: 31.2 GM/DL
MCV RBC AUTO: 94.8 FL
MONOCYTES # BLD AUTO: 0.56 K/UL
MONOCYTES NFR BLD AUTO: 10.4 %
NEUTROPHILS # BLD AUTO: 2.52 K/UL
NEUTROPHILS NFR BLD AUTO: 47 %
PLATELET # BLD AUTO: 256 K/UL
POTASSIUM SERPL-SCNC: 4 MMOL/L
PROT SERPL-MCNC: 6.4 G/DL
PT BLD: 10.9 SEC
RBC # BLD: 4.63 M/UL
RBC # FLD: 14 %
SODIUM SERPL-SCNC: 138 MMOL/L
WBC # FLD AUTO: 5.36 K/UL

## 2024-06-20 ENCOUNTER — TRANSCRIPTION ENCOUNTER (OUTPATIENT)
Age: 69
End: 2024-06-20

## 2024-06-20 RX ORDER — ATORVASTATIN CALCIUM 10 MG/1
10 TABLET, FILM COATED ORAL
Qty: 90 | Refills: 3 | Status: ACTIVE | COMMUNITY
Start: 2019-06-03 | End: 1900-01-01

## 2024-07-01 ENCOUNTER — LABORATORY RESULT (OUTPATIENT)
Age: 69
End: 2024-07-01

## 2024-07-01 LAB — A1CG - A1C WITH ESTIMATED AVERAGE GLUCOSE: 5.7

## 2024-07-19 ENCOUNTER — APPOINTMENT (OUTPATIENT)
Dept: CARDIOLOGY | Facility: CLINIC | Age: 69
End: 2024-07-19
Payer: MEDICARE

## 2024-07-19 VITALS
DIASTOLIC BLOOD PRESSURE: 65 MMHG | RESPIRATION RATE: 16 BRPM | WEIGHT: 108 LBS | HEIGHT: 62 IN | HEART RATE: 88 BPM | SYSTOLIC BLOOD PRESSURE: 101 MMHG | BODY MASS INDEX: 19.88 KG/M2

## 2024-07-19 DIAGNOSIS — E78.00 PURE HYPERCHOLESTEROLEMIA, UNSPECIFIED: ICD-10-CM

## 2024-07-19 DIAGNOSIS — I70.0 ATHEROSCLEROSIS OF AORTA: ICD-10-CM

## 2024-07-19 DIAGNOSIS — I38 ENDOCARDITIS, VALVE UNSPECIFIED: ICD-10-CM

## 2024-07-19 PROCEDURE — 99214 OFFICE O/P EST MOD 30 MIN: CPT

## 2024-07-19 PROCEDURE — G2211 COMPLEX E/M VISIT ADD ON: CPT

## 2024-07-19 RX ORDER — FAMOTIDINE 10 MG/1
TABLET, FILM COATED ORAL
Refills: 0 | Status: ACTIVE | COMMUNITY

## 2024-09-01 ENCOUNTER — NON-APPOINTMENT (OUTPATIENT)
Age: 69
End: 2024-09-01

## 2024-09-27 ENCOUNTER — APPOINTMENT (OUTPATIENT)
Dept: CARDIOLOGY | Facility: CLINIC | Age: 69
End: 2024-09-27

## 2024-09-27 PROCEDURE — 93306 TTE W/DOPPLER COMPLETE: CPT

## 2024-10-29 ENCOUNTER — APPOINTMENT (OUTPATIENT)
Dept: GASTROENTEROLOGY | Facility: CLINIC | Age: 69
End: 2024-10-29
Payer: MEDICARE

## 2024-10-29 VITALS
SYSTOLIC BLOOD PRESSURE: 105 MMHG | BODY MASS INDEX: 20.06 KG/M2 | HEART RATE: 65 BPM | HEIGHT: 62 IN | OXYGEN SATURATION: 100 % | WEIGHT: 109 LBS | DIASTOLIC BLOOD PRESSURE: 77 MMHG

## 2024-10-29 PROCEDURE — 99215 OFFICE O/P EST HI 40 MIN: CPT

## 2024-10-29 RX ORDER — DENOSUMAB 60 MG/ML
60 INJECTION SUBCUTANEOUS
Refills: 0 | Status: ACTIVE | COMMUNITY

## 2024-10-30 ENCOUNTER — APPOINTMENT (OUTPATIENT)
Dept: CARDIOLOGY | Facility: CLINIC | Age: 69
End: 2024-10-30
Payer: MEDICARE

## 2024-10-30 VITALS
WEIGHT: 109 LBS | HEIGHT: 62 IN | BODY MASS INDEX: 20.06 KG/M2 | RESPIRATION RATE: 16 BRPM | DIASTOLIC BLOOD PRESSURE: 60 MMHG | HEART RATE: 56 BPM | SYSTOLIC BLOOD PRESSURE: 112 MMHG | OXYGEN SATURATION: 97 %

## 2024-10-30 DIAGNOSIS — E78.00 PURE HYPERCHOLESTEROLEMIA, UNSPECIFIED: ICD-10-CM

## 2024-10-30 DIAGNOSIS — I27.20 PULMONARY HYPERTENSION, UNSPECIFIED: ICD-10-CM

## 2024-10-30 DIAGNOSIS — I70.0 ATHEROSCLEROSIS OF AORTA: ICD-10-CM

## 2024-10-30 DIAGNOSIS — Q21.0 VENTRICULAR SEPTAL DEFECT: ICD-10-CM

## 2024-10-30 DIAGNOSIS — R00.2 PALPITATIONS: ICD-10-CM

## 2024-10-30 PROCEDURE — G2211 COMPLEX E/M VISIT ADD ON: CPT

## 2024-10-30 PROCEDURE — 99214 OFFICE O/P EST MOD 30 MIN: CPT

## 2024-10-30 PROCEDURE — 93000 ELECTROCARDIOGRAM COMPLETE: CPT

## 2024-11-07 ENCOUNTER — OUTPATIENT (OUTPATIENT)
Dept: OUTPATIENT SERVICES | Facility: HOSPITAL | Age: 69
LOS: 1 days | End: 2024-11-07

## 2024-11-07 DIAGNOSIS — G47.33 OBSTRUCTIVE SLEEP APNEA (ADULT) (PEDIATRIC): ICD-10-CM

## 2024-11-07 PROCEDURE — 95800 SLP STDY UNATTENDED: CPT

## 2024-11-13 ENCOUNTER — NON-APPOINTMENT (OUTPATIENT)
Age: 69
End: 2024-11-13

## 2024-11-27 ENCOUNTER — APPOINTMENT (OUTPATIENT)
Dept: PULMONOLOGY | Facility: CLINIC | Age: 69
End: 2024-11-27

## 2024-12-18 ENCOUNTER — NON-APPOINTMENT (OUTPATIENT)
Age: 69
End: 2024-12-18

## 2024-12-18 DIAGNOSIS — K76.9 LIVER DISEASE, UNSPECIFIED: ICD-10-CM

## 2024-12-19 ENCOUNTER — APPOINTMENT (OUTPATIENT)
Dept: PULMONOLOGY | Facility: CLINIC | Age: 69
End: 2024-12-19

## 2025-01-07 ENCOUNTER — APPOINTMENT (OUTPATIENT)
Dept: SURGERY | Facility: CLINIC | Age: 70
End: 2025-01-07
Payer: MEDICARE

## 2025-01-07 DIAGNOSIS — R92.30 DENSE BREASTS, UNSPECIFIED: ICD-10-CM

## 2025-01-07 PROCEDURE — 99212 OFFICE O/P EST SF 10 MIN: CPT

## 2025-01-08 ENCOUNTER — APPOINTMENT (OUTPATIENT)
Dept: PULMONOLOGY | Facility: CLINIC | Age: 70
End: 2025-01-08
Payer: MEDICARE

## 2025-01-08 VITALS
RESPIRATION RATE: 16 BRPM | HEART RATE: 58 BPM | DIASTOLIC BLOOD PRESSURE: 62 MMHG | WEIGHT: 109 LBS | BODY MASS INDEX: 20.06 KG/M2 | SYSTOLIC BLOOD PRESSURE: 102 MMHG | HEIGHT: 62 IN | OXYGEN SATURATION: 99 %

## 2025-01-08 DIAGNOSIS — M41.84 OTHER FORMS OF SCOLIOSIS, THORACIC REGION: ICD-10-CM

## 2025-01-08 DIAGNOSIS — I27.20 PULMONARY HYPERTENSION, UNSPECIFIED: ICD-10-CM

## 2025-01-08 PROCEDURE — 94729 DIFFUSING CAPACITY: CPT

## 2025-01-08 PROCEDURE — 85379 FIBRIN DEGRADATION QUANT: CPT

## 2025-01-08 PROCEDURE — 85018 HEMOGLOBIN: CPT | Mod: QW

## 2025-01-08 PROCEDURE — 99205 OFFICE O/P NEW HI 60 MIN: CPT | Mod: 25

## 2025-01-08 PROCEDURE — 94010 BREATHING CAPACITY TEST: CPT

## 2025-01-08 PROCEDURE — 94727 GAS DIL/WSHOT DETER LNG VOL: CPT

## 2025-01-14 LAB — DEPRECATED D DIMER PPP IA-ACNC: 175 NG/ML DDU

## 2025-01-18 ENCOUNTER — EMERGENCY (EMERGENCY)
Facility: HOSPITAL | Age: 70
LOS: 1 days | Discharge: HOME | End: 2025-01-18
Attending: STUDENT IN AN ORGANIZED HEALTH CARE EDUCATION/TRAINING PROGRAM
Payer: MEDICARE

## 2025-01-18 VITALS
SYSTOLIC BLOOD PRESSURE: 137 MMHG | WEIGHT: 114.42 LBS | RESPIRATION RATE: 18 BRPM | TEMPERATURE: 98 F | DIASTOLIC BLOOD PRESSURE: 74 MMHG | HEART RATE: 69 BPM | OXYGEN SATURATION: 100 %

## 2025-01-18 LAB
BASOPHILS # BLD AUTO: 0.09 K/UL — SIGNIFICANT CHANGE UP (ref 0–0.2)
BASOPHILS NFR BLD AUTO: 1.2 % — SIGNIFICANT CHANGE UP (ref 0–2)
EOSINOPHIL # BLD AUTO: 0.41 K/UL — SIGNIFICANT CHANGE UP (ref 0–0.5)
EOSINOPHIL NFR BLD AUTO: 5.5 % — SIGNIFICANT CHANGE UP (ref 0–6)
HCT VFR BLD CALC: 39.2 % — SIGNIFICANT CHANGE UP (ref 34.5–45)
HGB BLD-MCNC: 13.1 G/DL — SIGNIFICANT CHANGE UP (ref 11.5–15.5)
IMM GRANULOCYTES NFR BLD AUTO: 0.3 % — SIGNIFICANT CHANGE UP (ref 0–0.9)
LYMPHOCYTES # BLD AUTO: 3.24 K/UL — SIGNIFICANT CHANGE UP (ref 1–3.3)
LYMPHOCYTES # BLD AUTO: 43.7 % — SIGNIFICANT CHANGE UP (ref 13–44)
MCHC RBC-ENTMCNC: 30.4 PG — SIGNIFICANT CHANGE UP (ref 27–34)
MCHC RBC-ENTMCNC: 33.4 G/DL — SIGNIFICANT CHANGE UP (ref 32–36)
MCV RBC AUTO: 91 FL — SIGNIFICANT CHANGE UP (ref 80–100)
MONOCYTES # BLD AUTO: 0.64 K/UL — SIGNIFICANT CHANGE UP (ref 0–0.9)
MONOCYTES NFR BLD AUTO: 8.6 % — SIGNIFICANT CHANGE UP (ref 2–14)
NEUTROPHILS # BLD AUTO: 3.01 K/UL — SIGNIFICANT CHANGE UP (ref 1.8–7.4)
NEUTROPHILS NFR BLD AUTO: 40.7 % — LOW (ref 43–77)
PLATELET # BLD AUTO: 258 K/UL — SIGNIFICANT CHANGE UP (ref 150–400)
RBC # BLD: 4.31 M/UL — SIGNIFICANT CHANGE UP (ref 3.8–5.2)
RBC # FLD: 13.5 % — SIGNIFICANT CHANGE UP (ref 10.3–14.5)
WBC # BLD: 7.41 K/UL — SIGNIFICANT CHANGE UP (ref 3.8–10.5)
WBC # FLD AUTO: 7.41 K/UL — SIGNIFICANT CHANGE UP (ref 3.8–10.5)

## 2025-01-18 PROCEDURE — 93010 ELECTROCARDIOGRAM REPORT: CPT

## 2025-01-18 PROCEDURE — 99285 EMERGENCY DEPT VISIT HI MDM: CPT

## 2025-01-18 PROCEDURE — 71045 X-RAY EXAM CHEST 1 VIEW: CPT | Mod: 26

## 2025-01-19 VITALS — TEMPERATURE: 99 F | SYSTOLIC BLOOD PRESSURE: 135 MMHG | HEART RATE: 81 BPM | DIASTOLIC BLOOD PRESSURE: 72 MMHG

## 2025-01-19 LAB
ALBUMIN SERPL ELPH-MCNC: 3.9 G/DL — SIGNIFICANT CHANGE UP (ref 3.3–5.2)
ALP SERPL-CCNC: 53 U/L — SIGNIFICANT CHANGE UP (ref 40–120)
ALT FLD-CCNC: 10 U/L — SIGNIFICANT CHANGE UP
ANION GAP SERPL CALC-SCNC: 10 MMOL/L — SIGNIFICANT CHANGE UP (ref 5–17)
APTT BLD: 31 SEC — SIGNIFICANT CHANGE UP (ref 24.5–35.6)
AST SERPL-CCNC: 27 U/L — SIGNIFICANT CHANGE UP
BILIRUB SERPL-MCNC: 0.2 MG/DL — LOW (ref 0.4–2)
BUN SERPL-MCNC: 24.8 MG/DL — HIGH (ref 8–20)
CALCIUM SERPL-MCNC: 8.8 MG/DL — SIGNIFICANT CHANGE UP (ref 8.4–10.5)
CHLORIDE SERPL-SCNC: 104 MMOL/L — SIGNIFICANT CHANGE UP (ref 96–108)
CO2 SERPL-SCNC: 24 MMOL/L — SIGNIFICANT CHANGE UP (ref 22–29)
CREAT SERPL-MCNC: 1.03 MG/DL — SIGNIFICANT CHANGE UP (ref 0.5–1.3)
EGFR: 58 ML/MIN/1.73M2 — LOW
EGFR: 58 ML/MIN/1.73M2 — LOW
GLUCOSE SERPL-MCNC: 97 MG/DL — SIGNIFICANT CHANGE UP (ref 70–99)
INR BLD: 1 RATIO — SIGNIFICANT CHANGE UP (ref 0.85–1.16)
MAGNESIUM SERPL-MCNC: 2.1 MG/DL — SIGNIFICANT CHANGE UP (ref 1.6–2.6)
NT-PROBNP SERPL-SCNC: 50 PG/ML — SIGNIFICANT CHANGE UP (ref 0–300)
POTASSIUM SERPL-MCNC: 4 MMOL/L — SIGNIFICANT CHANGE UP (ref 3.5–5.3)
POTASSIUM SERPL-SCNC: 4 MMOL/L — SIGNIFICANT CHANGE UP (ref 3.5–5.3)
PROT SERPL-MCNC: 6.2 G/DL — LOW (ref 6.6–8.7)
PROTHROM AB SERPL-ACNC: 11.6 SEC — SIGNIFICANT CHANGE UP (ref 9.9–13.4)
SODIUM SERPL-SCNC: 138 MMOL/L — SIGNIFICANT CHANGE UP (ref 135–145)
TROPONIN T, HIGH SENSITIVITY RESULT: <6 NG/L — SIGNIFICANT CHANGE UP (ref 0–51)

## 2025-01-19 PROCEDURE — 71275 CT ANGIOGRAPHY CHEST: CPT | Mod: MC

## 2025-01-19 PROCEDURE — 85730 THROMBOPLASTIN TIME PARTIAL: CPT

## 2025-01-19 PROCEDURE — 84484 ASSAY OF TROPONIN QUANT: CPT

## 2025-01-19 PROCEDURE — 36415 COLL VENOUS BLD VENIPUNCTURE: CPT

## 2025-01-19 PROCEDURE — 80053 COMPREHEN METABOLIC PANEL: CPT

## 2025-01-19 PROCEDURE — 85025 COMPLETE CBC W/AUTO DIFF WBC: CPT

## 2025-01-19 PROCEDURE — 93005 ELECTROCARDIOGRAM TRACING: CPT

## 2025-01-19 PROCEDURE — 71275 CT ANGIOGRAPHY CHEST: CPT | Mod: 26

## 2025-01-19 PROCEDURE — 71045 X-RAY EXAM CHEST 1 VIEW: CPT

## 2025-01-19 PROCEDURE — 85610 PROTHROMBIN TIME: CPT

## 2025-01-19 PROCEDURE — 83880 ASSAY OF NATRIURETIC PEPTIDE: CPT

## 2025-01-19 PROCEDURE — 99285 EMERGENCY DEPT VISIT HI MDM: CPT | Mod: 25

## 2025-01-19 PROCEDURE — 83735 ASSAY OF MAGNESIUM: CPT

## 2025-02-05 ENCOUNTER — APPOINTMENT (OUTPATIENT)
Dept: CARDIOLOGY | Facility: CLINIC | Age: 70
End: 2025-02-05
Payer: MEDICARE

## 2025-02-05 ENCOUNTER — NON-APPOINTMENT (OUTPATIENT)
Age: 70
End: 2025-02-05

## 2025-02-05 VITALS
HEART RATE: 68 BPM | RESPIRATION RATE: 16 BRPM | DIASTOLIC BLOOD PRESSURE: 60 MMHG | HEIGHT: 62 IN | OXYGEN SATURATION: 98 % | WEIGHT: 109 LBS | BODY MASS INDEX: 20.06 KG/M2 | SYSTOLIC BLOOD PRESSURE: 100 MMHG

## 2025-02-05 DIAGNOSIS — E78.00 PURE HYPERCHOLESTEROLEMIA, UNSPECIFIED: ICD-10-CM

## 2025-02-05 DIAGNOSIS — I70.0 ATHEROSCLEROSIS OF AORTA: ICD-10-CM

## 2025-02-05 DIAGNOSIS — Q21.0 VENTRICULAR SEPTAL DEFECT: ICD-10-CM

## 2025-02-05 DIAGNOSIS — R00.2 PALPITATIONS: ICD-10-CM

## 2025-02-05 DIAGNOSIS — I27.20 PULMONARY HYPERTENSION, UNSPECIFIED: ICD-10-CM

## 2025-02-05 PROCEDURE — 99214 OFFICE O/P EST MOD 30 MIN: CPT

## 2025-02-05 PROCEDURE — G2211 COMPLEX E/M VISIT ADD ON: CPT

## 2025-02-05 PROCEDURE — 93000 ELECTROCARDIOGRAM COMPLETE: CPT

## 2025-03-26 ENCOUNTER — APPOINTMENT (OUTPATIENT)
Dept: CARDIOLOGY | Facility: CLINIC | Age: 70
End: 2025-03-26

## 2025-03-26 PROCEDURE — 93306 TTE W/DOPPLER COMPLETE: CPT

## 2025-04-07 ENCOUNTER — APPOINTMENT (OUTPATIENT)
Dept: CARDIOLOGY | Facility: CLINIC | Age: 70
End: 2025-04-07
Payer: MEDICARE

## 2025-04-07 VITALS
RESPIRATION RATE: 16 BRPM | OXYGEN SATURATION: 98 % | WEIGHT: 115 LBS | SYSTOLIC BLOOD PRESSURE: 120 MMHG | DIASTOLIC BLOOD PRESSURE: 80 MMHG | HEIGHT: 62 IN | BODY MASS INDEX: 21.16 KG/M2 | HEART RATE: 75 BPM

## 2025-04-07 DIAGNOSIS — M81.0 AGE-RELATED OSTEOPOROSIS W/OUT CURRENT PATHOLOGICAL FRACTURE: ICD-10-CM

## 2025-04-07 DIAGNOSIS — I47.10 SUPRAVENTRICULAR TACHYCARDIA, UNSPECIFIED: ICD-10-CM

## 2025-04-07 DIAGNOSIS — I27.20 PULMONARY HYPERTENSION, UNSPECIFIED: ICD-10-CM

## 2025-04-07 DIAGNOSIS — R00.2 PALPITATIONS: ICD-10-CM

## 2025-04-07 DIAGNOSIS — Q21.0 VENTRICULAR SEPTAL DEFECT: ICD-10-CM

## 2025-04-07 DIAGNOSIS — I70.0 ATHEROSCLEROSIS OF AORTA: ICD-10-CM

## 2025-04-07 DIAGNOSIS — K21.9 GASTRO-ESOPHAGEAL REFLUX DISEASE W/OUT ESOPHAGITIS: ICD-10-CM

## 2025-04-07 PROCEDURE — G2211 COMPLEX E/M VISIT ADD ON: CPT

## 2025-04-07 PROCEDURE — 99214 OFFICE O/P EST MOD 30 MIN: CPT

## 2025-04-07 PROCEDURE — 93000 ELECTROCARDIOGRAM COMPLETE: CPT

## 2025-04-07 RX ORDER — DILTIAZEM HYDROCHLORIDE 120 MG/1
120 CAPSULE, EXTENDED RELEASE ORAL DAILY
Qty: 90 | Refills: 1 | Status: ACTIVE | COMMUNITY
Start: 2025-04-07 | End: 1900-01-01

## 2025-04-07 RX ORDER — FAMOTIDINE 40 MG/1
40 TABLET, FILM COATED ORAL
Qty: 90 | Refills: 3 | Status: ACTIVE | COMMUNITY
Start: 2025-04-07 | End: 1900-01-01

## 2025-04-07 RX ORDER — DENOSUMAB 60 MG/ML
INJECTION SUBCUTANEOUS
Refills: 0 | Status: ACTIVE | COMMUNITY

## 2025-05-06 ENCOUNTER — NON-APPOINTMENT (OUTPATIENT)
Age: 70
End: 2025-05-06

## 2025-05-08 ENCOUNTER — APPOINTMENT (OUTPATIENT)
Dept: GASTROENTEROLOGY | Facility: CLINIC | Age: 70
End: 2025-05-08

## 2025-05-08 VITALS
OXYGEN SATURATION: 98 % | HEART RATE: 64 BPM | BODY MASS INDEX: 21.16 KG/M2 | WEIGHT: 115 LBS | RESPIRATION RATE: 14 BRPM | DIASTOLIC BLOOD PRESSURE: 73 MMHG | TEMPERATURE: 97.7 F | HEIGHT: 62 IN | SYSTOLIC BLOOD PRESSURE: 118 MMHG

## 2025-05-08 DIAGNOSIS — Z09 ENCOUNTER FOR FOLLOW-UP EXAMINATION AFTER COMPLETED TREATMENT FOR CONDITIONS OTHER THAN MALIGNANT NEOPLASM: ICD-10-CM

## 2025-05-08 PROCEDURE — 99214 OFFICE O/P EST MOD 30 MIN: CPT

## 2025-05-16 ENCOUNTER — NON-APPOINTMENT (OUTPATIENT)
Age: 70
End: 2025-05-16

## 2025-06-19 ENCOUNTER — APPOINTMENT (OUTPATIENT)
Dept: CARDIOLOGY | Facility: CLINIC | Age: 70
End: 2025-06-19
Payer: MEDICARE

## 2025-06-19 VITALS
BODY MASS INDEX: 20.98 KG/M2 | WEIGHT: 114 LBS | HEART RATE: 83 BPM | OXYGEN SATURATION: 98 % | SYSTOLIC BLOOD PRESSURE: 132 MMHG | DIASTOLIC BLOOD PRESSURE: 70 MMHG | HEIGHT: 62 IN | RESPIRATION RATE: 16 BRPM

## 2025-06-19 PROCEDURE — 93000 ELECTROCARDIOGRAM COMPLETE: CPT

## 2025-06-19 PROCEDURE — G2211 COMPLEX E/M VISIT ADD ON: CPT

## 2025-06-19 PROCEDURE — 99214 OFFICE O/P EST MOD 30 MIN: CPT

## 2025-06-19 RX ORDER — METOPROLOL SUCCINATE 25 MG/1
25 TABLET, EXTENDED RELEASE ORAL DAILY
Qty: 30 | Refills: 5 | Status: ACTIVE | COMMUNITY
Start: 2025-06-19 | End: 1900-01-01

## 2025-07-31 ENCOUNTER — APPOINTMENT (OUTPATIENT)
Dept: CARDIOLOGY | Facility: CLINIC | Age: 70
End: 2025-07-31
Payer: MEDICARE

## 2025-07-31 VITALS
RESPIRATION RATE: 16 BRPM | HEART RATE: 52 BPM | WEIGHT: 113 LBS | BODY MASS INDEX: 20.8 KG/M2 | HEIGHT: 62 IN | OXYGEN SATURATION: 98 % | DIASTOLIC BLOOD PRESSURE: 60 MMHG | SYSTOLIC BLOOD PRESSURE: 100 MMHG

## 2025-07-31 DIAGNOSIS — R00.2 PALPITATIONS: ICD-10-CM

## 2025-07-31 DIAGNOSIS — I70.0 ATHEROSCLEROSIS OF AORTA: ICD-10-CM

## 2025-07-31 DIAGNOSIS — Q21.0 VENTRICULAR SEPTAL DEFECT: ICD-10-CM

## 2025-07-31 DIAGNOSIS — I27.20 PULMONARY HYPERTENSION, UNSPECIFIED: ICD-10-CM

## 2025-07-31 DIAGNOSIS — I47.10 SUPRAVENTRICULAR TACHYCARDIA, UNSPECIFIED: ICD-10-CM

## 2025-07-31 PROCEDURE — 99214 OFFICE O/P EST MOD 30 MIN: CPT

## 2025-07-31 PROCEDURE — G2211 COMPLEX E/M VISIT ADD ON: CPT

## 2025-07-31 PROCEDURE — 93000 ELECTROCARDIOGRAM COMPLETE: CPT

## 2025-09-08 ENCOUNTER — NON-APPOINTMENT (OUTPATIENT)
Age: 70
End: 2025-09-08

## 2025-09-09 ENCOUNTER — APPOINTMENT (OUTPATIENT)
Dept: CARDIOLOGY | Facility: CLINIC | Age: 70
End: 2025-09-09
Payer: MEDICARE

## 2025-09-09 VITALS
BODY MASS INDEX: 21.35 KG/M2 | HEIGHT: 62 IN | HEART RATE: 66 BPM | RESPIRATION RATE: 16 BRPM | DIASTOLIC BLOOD PRESSURE: 80 MMHG | SYSTOLIC BLOOD PRESSURE: 120 MMHG | WEIGHT: 116 LBS | OXYGEN SATURATION: 98 %

## 2025-09-09 DIAGNOSIS — I47.10 SUPRAVENTRICULAR TACHYCARDIA, UNSPECIFIED: ICD-10-CM

## 2025-09-09 DIAGNOSIS — I27.20 PULMONARY HYPERTENSION, UNSPECIFIED: ICD-10-CM

## 2025-09-09 DIAGNOSIS — Q21.0 VENTRICULAR SEPTAL DEFECT: ICD-10-CM

## 2025-09-09 DIAGNOSIS — R07.9 CHEST PAIN, UNSPECIFIED: ICD-10-CM

## 2025-09-09 DIAGNOSIS — R00.2 PALPITATIONS: ICD-10-CM

## 2025-09-09 DIAGNOSIS — I70.0 ATHEROSCLEROSIS OF AORTA: ICD-10-CM

## 2025-09-09 PROCEDURE — 99214 OFFICE O/P EST MOD 30 MIN: CPT

## 2025-09-09 PROCEDURE — G2211 COMPLEX E/M VISIT ADD ON: CPT

## 2025-09-09 PROCEDURE — 93000 ELECTROCARDIOGRAM COMPLETE: CPT

## 2025-09-09 RX ORDER — VITAMIN/MINERAL SUPPLEMENT WITH MAGNESIUM CARBONATE, CALCIUM CARBONATE AND FOLIC ACID 115; 80; 1 MG/1; MG/1; MG/1
TABLET, FILM COATED ORAL
Refills: 0 | Status: ACTIVE | COMMUNITY

## 2025-09-19 ENCOUNTER — TRANSCRIPTION ENCOUNTER (OUTPATIENT)
Age: 70
End: 2025-09-19

## 2025-09-25 PROBLEM — M17.11 PRIMARY LOCALIZED OSTEOARTHRITIS OF RIGHT KNEE: Status: ACTIVE | Noted: 2025-09-25
